# Patient Record
(demographics unavailable — no encounter records)

---

## 2024-12-11 NOTE — PLAN
[FreeTextEntry1] : 46-year-old female presents complaining of 1 episode of midcycle staining.  Patient with a history of endometrial polyp D&C hysteroscopy 2 times previously.  Patient with known multifibroid uterus seen by Dr. Hdz in September 2024.  Transvaginal ultrasound with 3D imaging confirms multifibroid uterus with thickened heterogeneous lining cannot rule out polyp or fibroid in cavity.  Unremarkable adnexa.  Discussed with patient the importance of doing an endometrial biopsy to rule out any pathology and then we can follow-up and discuss if patient would like a progesterone IUD or progesterone only pill.  Misoprostol 100 mcg was called in for patient to take 2 hours prior to procedure with NSAIDs.  Endometrial biopsy will be scheduled after patient's next menses.  This plan was discussed and reviewed with Dr. Phil Hooks.

## 2024-12-11 NOTE — HISTORY OF PRESENT ILLNESS
[FreeTextEntry1] : 46-year-old female with known history of fibroid uterus and endometrial polyp presents complaining of 1 episode of midcycle staining.  Patient states not uncomfortable no cramping.  Patient saw Dr. Burden September 2024 confirmed multifibroid uterus and unremarkable adnexa.  Patient is currently not using any contraceptive condoms only. [Mammogramdate] : 04/24

## 2024-12-11 NOTE — PHYSICAL EXAM
[FreeTextEntry2] : Nakia Daigle  [Appropriately responsive] : appropriately responsive [Examination Of The Breasts] : a normal appearance [No Masses] : no breast masses were palpable [Labia Majora] : normal [Labia Minora] : normal [Normal] : normal [Uterine Adnexae] : normal [FreeTextEntry6] : fibroid uterus non tender

## 2024-12-11 NOTE — PROCEDURE
[Fibroid Uterus] : fibroid uterus [Abnormal Uterine Bleeding] : abnormal uterine bleeding [Transvaginal Ultrasound] : transvaginal ultrasound [3D Ultrasound] : 3D ultrasound [FreeTextEntry3] : Transvaginal ultrasound with 3D imaging was performed.  TVS confirms multifibroid uterus with heterogeneously thickened endometrium.  Unremarkable adnexa.  Cannot rule out endometrial polyp or intracavitary fibroid.

## 2025-01-02 NOTE — PROCEDURE
[Endometrial Biopsy] : Endometrial biopsy [Irregular Bleeding] : irregular uterine bleeding [Risks] : risks [Benefits] : benefits [Patient] : patient [Infection] : infection [Bleeding] : bleeding [Cytotec] : Cytotec [None] : none [Tenaculum] : Tenaculum [Easy Passage] : Easy passage [Mid Position] : mid position [Specimen Collected] : collected [Sent to Pathology] : placed in buffered formalin and sent for pathology [Tolerated Well] : Patient tolerated the procedure well [No Complications] : No complications [de-identified] : H/o endometrial polyp and fibroid uterus

## 2025-01-02 NOTE — PLAN
[FreeTextEntry1] : 46-year-old para 0 presents for endometrial biopsy today.  Patient with history of midcycle staining history of endometrial polyp and fibroid uterus.  Patient states LMP was normal in December.  Patient premedicated with a 50 mcg dose of Cytotec.  Risks and benefits were discussed with patient alternatives reviewed with patient.  Cervix was prepped with iodine swab x 3 tenaculum was placed on the anterior lip of the uterus was sounded with the [place] up to 6 cm endometrial biopsy was performed patient tolerated the procedure well tissue was sent off to pathology.  A telehealth visit was scheduled for 7 to 10 days to discuss the results with patient and make a plan.

## 2025-03-10 NOTE — PROCEDURE
[Fibroid Uterus] : fibroid uterus [Abnormal Uterine Bleeding] : abnormal uterine bleeding [Transvaginal Ultrasound] : transvaginal ultrasound [3D Ultrasound] : 3D ultrasound

## 2025-03-10 NOTE — HISTORY OF PRESENT ILLNESS
[FreeTextEntry1] : 46-year-old  0 para 0 LMP  presents for annual exam.  Since patient was seen in 2025 for endometrial biopsy for midcycle staining, patient states her menses have been regular without any midcycle staining.  Patient with known fibroid uterus denies any pain.  Patient notes day 3 of menses is her only heavy day and is very manageable..  Patient has not started Slynd as directed yet secondary to the fact that she was on an antifungal for tinea versicolor.  Patient states she completed her 3-month regimen of her antifungal I will now start the Slynd as directed. [Mammogramdate] : 06/2024 [BreastSonogramDate] : 06/2024

## 2025-03-10 NOTE — PLAN
[FreeTextEntry1] : 46-year-old  0 para 0P02/25 presents for annual exam.  Patient with history of endometrial polyp and had recent midcycle staining, endometrial biopsy performed 2025 showed proliferative endometrium.  Patient has not yet started her Slynd OCP as directed secondary to her completing a course of antifungal medication for tinea versicolor.  Patient states there is interaction with antifungal and oral contraceptive.  Patient states since January she has had no irregular vaginal bleeding or midcycle staining.  Breast and pelvic exams were performed today Pap GC chlamydia culture and stool guaiac were all performed today.  Instructed patient on how to start her oral contraceptive Slynd as directed.  Patient states mammogram and sonogram will be due in  a referral will be given to patient advised patient to follow-up in 6 months or sooner.

## 2025-03-10 NOTE — PHYSICAL EXAM
[Appropriately responsive] : appropriately responsive [Soft] : soft [Non-tender] : non-tender [Examination Of The Breasts] : a normal appearance [No Masses] : no breast masses were palpable [Labia Majora] : normal [Labia Minora] : normal [Normal] : normal [Uterine Adnexae] : normal [Normal rectal exam] : was normal [Normal Brown Stool] : was normal and brown [FreeTextEntry2] : Lorie [Occult Blood Positive] : was negative for occult blood analysis [Gross Blood] : no gross blood [FreeTextEntry8] : Cervix closed uterus small no adnexal mass palpable.